# Patient Record
Sex: MALE | HISPANIC OR LATINO
[De-identification: names, ages, dates, MRNs, and addresses within clinical notes are randomized per-mention and may not be internally consistent; named-entity substitution may affect disease eponyms.]

---

## 2019-06-24 ENCOUNTER — RESULT REVIEW (OUTPATIENT)
Age: 64
End: 2019-06-24

## 2021-02-26 ENCOUNTER — OUTPATIENT (OUTPATIENT)
Dept: OUTPATIENT SERVICES | Facility: HOSPITAL | Age: 66
LOS: 1 days | End: 2021-02-26
Payer: MEDICAID

## 2021-02-26 DIAGNOSIS — Z20.828 CONTACT WITH AND (SUSPECTED) EXPOSURE TO OTHER VIRAL COMMUNICABLE DISEASES: ICD-10-CM

## 2021-02-26 LAB — SARS-COV-2 RNA SPEC QL NAA+PROBE: SIGNIFICANT CHANGE UP

## 2021-02-26 PROCEDURE — C9803: CPT

## 2021-02-26 PROCEDURE — U0003: CPT

## 2021-02-26 PROCEDURE — U0005: CPT

## 2021-02-27 DIAGNOSIS — Z20.828 CONTACT WITH AND (SUSPECTED) EXPOSURE TO OTHER VIRAL COMMUNICABLE DISEASES: ICD-10-CM

## 2022-01-06 ENCOUNTER — NON-APPOINTMENT (OUTPATIENT)
Age: 67
End: 2022-01-06

## 2022-01-13 ENCOUNTER — APPOINTMENT (OUTPATIENT)
Dept: ORTHOPEDIC SURGERY | Facility: CLINIC | Age: 67
End: 2022-01-13
Payer: MEDICAID

## 2022-01-13 ENCOUNTER — TRANSCRIPTION ENCOUNTER (OUTPATIENT)
Age: 67
End: 2022-01-13

## 2022-01-13 PROBLEM — Z00.00 ENCOUNTER FOR PREVENTIVE HEALTH EXAMINATION: Status: ACTIVE | Noted: 2022-01-13

## 2022-01-13 PROCEDURE — 99204 OFFICE O/P NEW MOD 45 MIN: CPT | Mod: 25

## 2022-01-13 PROCEDURE — 20610 DRAIN/INJ JOINT/BURSA W/O US: CPT | Mod: RT

## 2022-01-13 NOTE — PHYSICAL EXAM
[de-identified] : Right knee Physical Examination:\par \par General: Alert and oriented x3.  In no acute distress.  Pleasant in nature with a normal affect.  No apparent respiratory distress. \par \par Erythema, Warmth, Rubor: Negative\par Swelling/Edema: Positive\par ROM: 0-120 degrees, pain with hyperflexion. \par Meenakshi's Test: Positive \par Medial Joint Line TTP: Positive\par Lateral Joint Line TTP: Negative\par Lachman Exam/Anterior Drawer/Pivot Shift Test: Negative \par MCL Pain: Negative\par LCL Pain: Negative\par Iliotibial Band Pain: Negative\par Patellofemoral Joint Pain: Negative\par Patellar Tendon TTP: Negative\par Pes Anserinus TTP: Negative\par Homans Sign: Negative\par Posterior Knee Pain: Negative\par Neuro: Intact with no sensory or motor deficits\par \par \par Left ankle Physical Examination:\par \par General: Alert and oriented x3.  In no acute distress.  Pleasant in nature with a normal affect.  No apparent respiratory distress. \par Erythema, Warmth, Rubor: Negative\par Swelling: Negative\par \par ROM:\par 1. Dorsiflexion: 10 degrees\par 2. Plantarflexion: 40 degrees\par 3. Inversion: 10 degrees\par 4. Eversion: 10 degrees\par \par Tenderness to Palpation: \par 1. Lateral Malleolus: Negative\par 2. Medial Malleolus: Negative\par 3. Proximal Fibular Pain: Negative\par 4. Heel Pain: Negative\par 5. Cuboid: Negative\par 6. Navicular: Negative\par 7. Tibiotalar Joint: Positive\par 8. Subtalar Joint: Negative\par 9. Posterior Recess: Negative\par \par Tendon Pain:\par 1. Achilles: Negative\par 2. Peroneals: Negative\par 3. Posterior Tibialis: Negative\par 4. Tibialis Anterior: Negative\par \par Ligament Pain:\par 1. ATFL: Negative\par 2. CFL: Negative \par 3. PTFL: Negative\par 4. Deltoid Ligaments: Negative\par 5. Lis Franc Ligament: Negative\par \par Stability: \par 1. Anterior Drawer: Negative\par 2. Posterior Drawer: Negative\par \par Strength: 5/5 TA/GS/EHL\par \par Pulses: 2+ DP/PT Pulses\par \par Neuro: Intact motor and sensory\par \par Additional Test:\par 1. Calcaneal Squeeze Test: Negative\par 2. Syndesmosis Squeeze Test: Negative [de-identified] : ELECTRONIC REFERRAL/ORDER\par \par Home\par \par Electronic Referrals/Orders\par \par Office Location: [address] [place] [address], [place], [address]\par Office Phone: [phone]\par Office Fax: [phone]\par PATIENT NAME: Cole Griffith\par PATIENT PHONE NUMBER:\par PATIENT ID: [id number]\par : 1955\par DATE OF EXAM: 2021\par R. Phys. Name: Bertha Coronado\par R. Phys. Address: [address] [place] [address], [facility]\par R. Phys. Phone: [phone]\par MRI-RIGHT KNEE NON CONTRAST\par \par HISTORY: M25.561 Right knee pain\par \par Technique: MRI of the right knee was performed with sagittal, axial and coronal\par proton density pulse sequences including fat suppression techniques. Examination\par was performed on a 3.0 [employer]ia ultra high field wide bore magnet.\par \par Findings:\par \par There is complex tear with marked degeneration and loss of substance of\par posterior horn of medial meniscus. The body segment shows high-grade extrusion.\par There is no additional tear.\par \par There is complex tear of anterior horn of lateral meniscus with maceration.\par Horizontal tear extends through anterolateral aspect with partial extrusion.\par \par There is moderate mucoid degeneration of the anterior cruciate ligament that is\par impinged against intercondylar notch and tibial spine osteophytes there is\par reactive bone marrow edema of tibial spines.\par \par The medial collateral ligament and lateral collateral ligament complex are\par within normal limits.\par \par The extensor mechanism is intact.\par \par There is tricompartmental osteoarthritis with advanced changes of medial\par compartment with there is high-grade to complete loss of articular cartilages\par noted. There is subarticular reactive bone marrow edema greater at the\par peripheral aspect of medial condyle and less at tibial plateau. Moderate changes\par are present involving and patellofemoral joint. There are partial chondral\par defects in lateral compartment along with marginal osteophytes.\par \par The medullary bone marrow signal intensity is within normal limits. There is a\par small joint effusion. Chronic synovitis is present as well as large zones of\par synovial fatty hyperplasia predominantly in suprapatellar recess. There is\par marked irregularity of the infrapatellar fat; intermediate signal intensity\par tissue extending from the vicinity of tibial attachment of anterior horn of\par lateral meniscus and ACL footprint within infrapatellar fat may reflect chronic\par synovitis or displaced degenerated meniscal fragment (series 5 image 21); 2\par calcified loose body are also visualized at this level (series 5 image 19,\par series 3 image 25).\par \par There is no popliteal cyst. 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 Tear and maceration of anterior horn and tear of anterolateral aspect\par of lateral meniscus.\par \par Small joint effusion with chronic synovitis and lobular fatty synovial\par hyperplasia in suprapatellar recess. There is irregularity of infrapatellar fat\par with complex cystic change as described and loose bodies.\par \par Signed by: Delores Valera MD\par Signed Date: 2021 10:22 AM EST\par \par \par \par SIGNED BY: Delores Valera M.D., Ext. [id number] 2021 10:22 AM\par \par \par \par Office Location: [address] [place] [address], [facility]\par Office Phone: [phone]\par Office Fax: [phone]\par PATIENT NAME: Cole Griffith\par PATIENT PHONE NUMBER:\par PATIENT ID: [id number]\par : 1955\par DATE OF EXAM: 2021\par R. Phys. Name: Bertha Coronado\par R. Phys. Address: [address] [place] [address], [facility]\par R. Phys. Phone: [phone]\par LEFT ANKLE XRAY COMPLETE 3 OR MORE VIEWS\par \par HISTORY: M25.572 Left ankle pain\par \par AP, oblique, and lateral views of the left ankle are obtained.\par \par FINDINGS:\par \par Bony texture appears unremarkable. No acute fracture or dislocation present.\par There is subchondral lucency within the subarticular bone marrow of the medial\par aspect of the talar dome. The joint space of the ankle is intact. There is\par nonspecific calcifications within the soft tissues medial to the hindfoot. There\par is vascular calcification.\par \par \par IMPRESSION:\par \par \par \par \par \par Subchondral lucency within the subchondral bone marrow of the medial aspect of\par the talar dome evaluation. Correlation with MRI study of the ankle is\par recommended for further evaluation\par \par Nonspecific calcifications within the soft tissues medial to the talus.\par \par Vascular calcification.\par \par Signed by: Nataliia Arana MD\par Signed Date: 2021 12:47 PM EST\par \par \par \par SIGNED BY: Nataliia Arana M.D., Ext. [id number] 2021 12:47 PM\par \par IMPRESSION:\par \par \par \par \par \par Subchondral lucency within the subchondral bone marrow of the medial aspect of\par the talar dome evaluation. Correlation with MRI study of the ankle is\par recommended for further evaluation\par \par Nonspecific calcifications within the soft tissues medial to the talus.\par \par Vascular calcification.\par \par Signed by: Nataliia Arana MD\par Signed Date: 2021 12:47 PM EST\par \par \par \par SIGNED BY: Nataliia Arana M.D., Ext. [id number] 2021 12:47 PM\par \par \par Office Location: [address] [place] [address], [facility]\par Office Phone: [phone]\par Office Fax: [phone]\par PATIENT NAME: Cole Griffith\par PATIENT PHONE NUMBER:\par PATIENT ID: [id number]\par : 1955\par DATE OF EXAM: 2021\par R. Phys. Name: Bertha Coronado\par R. Phys. Address: [address] [place] [address], [address]\par R. Phys. Phone: [phone]\par RIGHT KNEE XRAY AP LATERAL AND OBLIQUE 3 VIEWS\par \par HISTORY: M25.561 Right knee pain\par \par AP, lateral, and oblique views of the right knee are obtained.\par \par There is no fracture, subluxation or dislocation. No osteoblastic or osteolytic\par lesions can be identified. There are degenerative change of patellofemoral,\par medial, and lateral joint compartments of the knee with medial joint space\par narrowing. The subarticular cortex is smooth. Trace joint effusion is\par demonstrated.\par \par IMPRESSION:\par There are moderate osteoarthritic degenerative changes. M17.11\par trace joint effusion\par \par There are no fractures.\par \par \par \par Signed by: Nataliia Arana MD\par Signed Date: 2021 12:44 PM EST\par \par \par \par SIGNED BY: Natlaiia Arana M.D., Ext. [id number] 2021 12:44 PM\par \par IMPRESSION:\par There are moderate osteoarthritic degenerative changes. M17.11\par trace joint effusion\par \par There are no fractures.\par \par \par \par Signed by: Nataliia Arana MD\par Signed Date: 2021 12:44 PM EST\par \par \par \par SIGNED BY: Nataliia Arana M.D., Ext. [id number] 2021 12:44 PM\par

## 2022-01-13 NOTE — PROCEDURE
[de-identified] : Laterality: Right Knee\par \par The risks and benefits of the injection were reviewed/discussed with the patient today in office and all of their questions were answered.  The injection site was the anterolateral aspect of the knee with the patient sitting up and the knees flexed to 90 degrees.  Prior to the injection, the injection site was prepped with chloroprep and a sterile field was created.  Sterile technique was carried out throughout the procedure.  After verbal consent from the patient we went ahead and injected the right knee today with 2 ml 40 mg Depo-Medrol, 5 ml 1% lidocaine and 4 ml of .50% Bupivacaine for a total of 10 ml with a 25 belle 1.5" needle.  The medication was placed into the knee without complication.  Post injection the patient reported no pain, had a normal gait and good motion of the knee.  The patient denied numbness and tingling going down their leg.  There were no complications during the procedure.

## 2022-01-13 NOTE — DISCUSSION/SUMMARY
[de-identified] : Assessment: Right osteoarthritis/Pain\par \par Plan:\par 1. RICE Therapy.\par 2. Antiinflammatories/Tylenol as needed for pain of discomfort. \par 3. The patient was advised to rest the knee for 24-48 hours post injection. The patient is able to resume normal activities in 24-48 hours post injection if the patient is experiencing minimal to no pain. \par 4. Continue with a home exercise/stretching program. \par 5. All the patients questions were answered. If the patient is experiencing any problems or has concerns they were advised to call the office or make an appointment to come in to be evaluated.\par \par *As for the left ankle I want a go ahead with the radiologist recommendation for the left ankle x-rays and order an MRI of the left ankle to evaluate for subchondral insufficiency stress fracture.  Once the MRI is completed the patient will come in to review.  I gave the patient an ASO brace for stability and compression.\par

## 2022-01-13 NOTE — HISTORY OF PRESENT ILLNESS
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  He does have more pain in the knee than the ankle.  He has x-rays from  radiology of the knee and the ankle and an MRI of the right knee.  He is seeking pain relief for his knee.  He does have osteoarthritis in the right knee.  He denies locking and catching of the knee.  As for the ankle he does have moderate pain in the ankle today.  No other complaints.

## 2022-02-14 ENCOUNTER — APPOINTMENT (OUTPATIENT)
Dept: ORTHOPEDIC SURGERY | Facility: CLINIC | Age: 67
End: 2022-02-14
Payer: MEDICAID

## 2022-02-14 DIAGNOSIS — M25.572 PAIN IN LEFT ANKLE AND JOINTS OF LEFT FOOT: ICD-10-CM

## 2022-02-14 DIAGNOSIS — M24.9 JOINT DERANGEMENT, UNSPECIFIED: ICD-10-CM

## 2022-02-14 DIAGNOSIS — M17.11 UNILATERAL PRIMARY OSTEOARTHRITIS, RIGHT KNEE: ICD-10-CM

## 2022-02-14 PROCEDURE — 99214 OFFICE O/P EST MOD 30 MIN: CPT

## 2022-02-14 NOTE — DISCUSSION/SUMMARY
[de-identified] : Today I had a lengthy discussion with the patient regarding their left ankle and right knee pain. I have addressed all the patient's concerns surrounding the pathology of their condition. MRI results were reviewed with the patient today in the clinic. I recommend the patient undergo a course of physical therapy for the left ankle  2-3 times a week for a total of 6-8 weeks. A prescription was given for the physical therapy today. I recommend that the patient utilize Voltaren gel topically. If the Voltaren gel could not be obtained, Icy Hot, Biofreeze, or Bengay can be utilized instead. The patient understood and verbally agreed to the treatment plan. All of their questions were answered and they were satisfied with the visit. The patient should call the office if they have any questions or experience worsening symptoms. I would like to see the patient back in the office in 2-3 months to reassess their condition. 				\par

## 2022-02-14 NOTE — PHYSICAL EXAM
[de-identified] : Right knee Physical Examination:\par \par General: Alert and oriented x3.  In no acute distress.  Pleasant in nature with a normal affect.  No apparent respiratory distress. \par \par Erythema, Warmth, Rubor: Negative\par Swelling/Edema: Positive\par ROM: 0-120 degrees, pain with hyperflexion. \par Meenakshi's Test: Positive \par Medial Joint Line TTP: Positive\par Lateral Joint Line TTP: Negative\par Lachman Exam/Anterior Drawer/Pivot Shift Test: Negative \par MCL Pain: Negative\par LCL Pain: Negative\par Iliotibial Band Pain: Negative\par Patellofemoral Joint Pain: Negative\par Patellar Tendon TTP: Negative\par Pes Anserinus TTP: Negative\par Homans Sign: Negative\par Posterior Knee Pain: Negative\par Neuro: Intact with no sensory or motor deficits\par \par \par Left ankle Physical Examination:\par General: Alert and oriented x3.  In no acute distress.  Pleasant in nature with a normal affect.  No apparent respiratory distress. \par Erythema, Warmth, Rubor: Negative\par Swelling: Negative\par \par ROM:\par 1. Dorsiflexion: 10 degrees\par 2. Plantarflexion: 40 degrees\par 3. Inversion: 10 degrees\par 4. Eversion: 10 degrees\par \par Tenderness to Palpation: \par 1. Lateral Malleolus: Negative\par 2. Medial Malleolus: Negative\par 3. Proximal Fibular Pain: Negative\par 4. Heel Pain: Negative\par 5. Cuboid: Negative\par 6. Navicular: Negative\par 7. Tibiotalar Joint: Positive\par 8. Subtalar Joint: Negative\par 9. Posterior Recess: Negative\par \par Tendon Pain:\par 1. Achilles: Negative\par 2. Peroneals: Negative\par 3. Posterior Tibialis: Negative\par 4. Tibialis Anterior: Negative\par \par Ligament Pain:\par 1. ATFL: Negative\par 2. CFL: Negative \par 3. PTFL: Negative\par 4. Deltoid Ligaments: Negative\par 5. Lis Franc Ligament: Negative\par \par Stability: \par 1. Anterior Drawer: Negative\par 2. Posterior Drawer: Negative\par \par Strength: 5/5 TA/GS/EHL\par \par Pulses: 2+ DP/PT Pulses\par \par Neuro: Intact motor and sensory\par \par Additional Test:\par 1. Calcaneal Squeeze Test: Negative\par 2. Syndesmosis Squeeze Test: Negative [de-identified] : MRI of Left ankle obtained on 1/31/22 at [facility] and read today 2/14/2022 showed: \par \par 1. Chronic sprain with attenuation of anterior talofibular and fibulocalcaneal ligaments. There is marked scarring of medial ankle ligaments. Scarring of superficial ligaments small pronounced and heterogeneous at mid to distal aspect peripheral to the head of talus to navicular insertion where some irregularity is present. There is no apparent abnormality of posterior tibialis tendon. \par 2. Small shallow chondral defects of medial talar dome. There is no osteochondral lesion.\par 3. Extensor retinacula bursitis.

## 2022-02-14 NOTE — HISTORY OF PRESENT ILLNESS
[de-identified] : 2/14/22: The patient is a 66 year old male presenting for a follow-up evaluation of left ankle pain. He presents today to review MRI results of the left ankle. He states that his pain scale is the same as his last evaluation. He has no other complaints. 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  He does have more pain in the knee than the ankle.  He has x-rays from  radiology of the knee and the ankle and an MRI of the right knee.  He is seeking pain relief for his knee.  He does have osteoarthritis in the right knee.  He denies locking and catching of the knee.  As for the ankle he does have moderate pain in the ankle today.  No other complaints.

## 2022-02-14 NOTE — ADDENDUM
[FreeTextEntry1] : I, Yolanda Leyva, acted solely as a scribe for Dr. Gerhard Bartholomew on this date 02/14/2022.\par \par All medical record entries made by the Scribe were at my, Dr. Gerhard Bartholomew, direction and personally dictated by me on 02/14/2022 . I have reviewed the chart and agree that the record accurately reflects my personal performance of the history, physical exam, assessment and plan. I have also personally directed, reviewed, and agreed with the chart.	\par

## 2023-09-22 ENCOUNTER — APPOINTMENT (OUTPATIENT)
Dept: UROLOGY | Facility: CLINIC | Age: 68
End: 2023-09-22
Payer: MEDICAID

## 2023-09-22 VITALS
OXYGEN SATURATION: 97 % | TEMPERATURE: 97.5 F | HEART RATE: 71 BPM | SYSTOLIC BLOOD PRESSURE: 142 MMHG | HEIGHT: 66 IN | DIASTOLIC BLOOD PRESSURE: 88 MMHG | WEIGHT: 214 LBS | RESPIRATION RATE: 15 BRPM | BODY MASS INDEX: 34.39 KG/M2

## 2023-09-22 DIAGNOSIS — R35.1 NOCTURIA: ICD-10-CM

## 2023-09-22 DIAGNOSIS — Z87.891 PERSONAL HISTORY OF NICOTINE DEPENDENCE: ICD-10-CM

## 2023-09-22 DIAGNOSIS — Z12.5 ENCOUNTER FOR SCREENING FOR MALIGNANT NEOPLASM OF PROSTATE: ICD-10-CM

## 2023-09-22 DIAGNOSIS — Z86.79 PERSONAL HISTORY OF OTHER DISEASES OF THE CIRCULATORY SYSTEM: ICD-10-CM

## 2023-09-22 DIAGNOSIS — N13.8 BENIGN PROSTATIC HYPERPLASIA WITH LOWER URINARY TRACT SYMPMS: ICD-10-CM

## 2023-09-22 DIAGNOSIS — N40.1 BENIGN PROSTATIC HYPERPLASIA WITH LOWER URINARY TRACT SYMPMS: ICD-10-CM

## 2023-09-22 PROCEDURE — 99204 OFFICE O/P NEW MOD 45 MIN: CPT

## 2023-09-22 RX ORDER — TAMSULOSIN HYDROCHLORIDE 0.4 MG/1
0.4 CAPSULE ORAL
Qty: 90 | Refills: 1 | Status: ACTIVE | COMMUNITY
Start: 2023-09-22 | End: 1900-01-01

## 2023-09-27 LAB
APPEARANCE: CLEAR
BACTERIA UR CULT: NORMAL
BACTERIA: NEGATIVE /HPF
BILIRUBIN URINE: NEGATIVE
BLOOD URINE: NEGATIVE
CAST: 0 /LPF
COLOR: YELLOW
EPITHELIAL CELLS: 0 /HPF
GLUCOSE QUALITATIVE U: NEGATIVE MG/DL
KETONES URINE: NEGATIVE MG/DL
LEUKOCYTE ESTERASE URINE: NEGATIVE
MICROSCOPIC-UA: NORMAL
NITRITE URINE: NEGATIVE
PH URINE: 6
PROTEIN URINE: NEGATIVE MG/DL
RED BLOOD CELLS URINE: 0 /HPF
SPECIFIC GRAVITY URINE: 1.01
UROBILINOGEN URINE: 0.2 MG/DL
WHITE BLOOD CELLS URINE: 0 /HPF

## 2023-11-17 ENCOUNTER — APPOINTMENT (OUTPATIENT)
Dept: UROLOGY | Facility: CLINIC | Age: 68
End: 2023-11-17

## 2024-11-11 ENCOUNTER — APPOINTMENT (OUTPATIENT)
Dept: ORTHOPEDIC SURGERY | Facility: CLINIC | Age: 69
End: 2024-11-11

## 2024-11-11 DIAGNOSIS — M17.12 UNILATERAL PRIMARY OSTEOARTHRITIS, LEFT KNEE: ICD-10-CM

## 2024-11-11 PROCEDURE — 73560 X-RAY EXAM OF KNEE 1 OR 2: CPT | Mod: LT

## 2024-11-11 PROCEDURE — 20610 DRAIN/INJ JOINT/BURSA W/O US: CPT | Mod: LT

## 2024-11-11 PROCEDURE — 99203 OFFICE O/P NEW LOW 30 MIN: CPT | Mod: 25

## 2024-11-14 VITALS — HEIGHT: 66 IN

## 2024-11-14 DIAGNOSIS — Z87.39 PERSONAL HISTORY OF OTHER DISEASES OF THE MUSCULOSKELETAL SYSTEM AND CONNECTIVE TISSUE: ICD-10-CM

## 2024-11-14 PROBLEM — M17.12 PRIMARY OSTEOARTHRITIS OF LEFT KNEE: Status: ACTIVE | Noted: 2024-11-11

## 2024-12-19 ENCOUNTER — APPOINTMENT (OUTPATIENT)
Dept: ORTHOPEDIC SURGERY | Facility: CLINIC | Age: 69
End: 2024-12-19

## 2024-12-19 DIAGNOSIS — R35.1 NOCTURIA: ICD-10-CM

## 2024-12-19 DIAGNOSIS — M17.11 UNILATERAL PRIMARY OSTEOARTHRITIS, RIGHT KNEE: ICD-10-CM

## 2024-12-19 DIAGNOSIS — M17.12 UNILATERAL PRIMARY OSTEOARTHRITIS, LEFT KNEE: ICD-10-CM

## 2024-12-19 PROCEDURE — 99212 OFFICE O/P EST SF 10 MIN: CPT | Mod: 25

## 2024-12-19 PROCEDURE — 20610 DRAIN/INJ JOINT/BURSA W/O US: CPT | Mod: RT

## 2024-12-19 PROCEDURE — 73560 X-RAY EXAM OF KNEE 1 OR 2: CPT | Mod: RT

## 2024-12-23 VITALS — HEIGHT: 66 IN

## 2025-01-15 ENCOUNTER — APPOINTMENT (OUTPATIENT)
Dept: ORTHOPEDIC SURGERY | Facility: CLINIC | Age: 70
End: 2025-01-15
Payer: MEDICARE

## 2025-01-15 DIAGNOSIS — M17.12 UNILATERAL PRIMARY OSTEOARTHRITIS, LEFT KNEE: ICD-10-CM

## 2025-01-15 DIAGNOSIS — M17.11 UNILATERAL PRIMARY OSTEOARTHRITIS, RIGHT KNEE: ICD-10-CM

## 2025-01-15 PROCEDURE — 20610 DRAIN/INJ JOINT/BURSA W/O US: CPT | Mod: LT

## 2025-01-15 PROCEDURE — 99213 OFFICE O/P EST LOW 20 MIN: CPT | Mod: 25

## 2025-01-22 VITALS — HEIGHT: 66 IN

## 2025-05-07 ENCOUNTER — APPOINTMENT (OUTPATIENT)
Dept: ORTHOPEDIC SURGERY | Facility: CLINIC | Age: 70
End: 2025-05-07